# Patient Record
(demographics unavailable — no encounter records)

---

## 2024-10-09 NOTE — DATA REVIEWED
[FreeTextEntry1] : 3 x-ray views taken in the office today of her right wrist show an oblique displaced fracture of the distal ulna in addition to degenerative changes throughout the wrist and hand.

## 2024-10-09 NOTE — DISCUSSION/SUMMARY
[de-identified] : I discussed case with Dr. Morales.  Today I placed the patient in a well-fitted and molded short arm cast.  She tolerated this well.  The patient and her son understands that the fracture is displaced and we are going to keep an eye on the displacement.  She will follow-up next week for repeat x-ray and evaluation.  Cast will be gone for about a month and then we will convert her to a cock-up wrist brace.  Cast care was discussed.  All questions were answered today.  She will take Tylenol for pain.

## 2024-10-09 NOTE — PHYSICAL EXAM
[de-identified] : Physical exam of her right wrist: She has some swelling of the proximal hand secondary to the Ace wrap that was applied.  There is some ecchymosis present on the wrist.  She is  nontender over the distal radius.  She is exquisitely tender over the distal ulna.  She cannot make a full fist.  She has pain with range of motion of the wrist.  There is a mild deformity to the wrist and hand.

## 2024-10-09 NOTE — HISTORY OF PRESENT ILLNESS
[de-identified] : Patient is an 89-year-old female here for evaluation of her right wrist.  She is accompanied by her son and an aide.  She resides at [facility].  On 10/4/2024, she hit her wrist on something hard.  They took an x-ray and diagnosed her with a distal ulna fracture.

## 2024-10-16 NOTE — HISTORY OF PRESENT ILLNESS
[de-identified] : Patient is a 89-year-old female here for reevaluation of right wrist closed ulna fracture. Patient was seen in office 10/9/24, resides at [facility]. Injury 10/4/24.  Patient states that she is doing well, no complaints of pain or numbness/ tingling.  RT wrist physical exam: Patient in short arm cast. No swelling, no ecchymosis, no erythema, no abrasions. Good ROM of finger / elbow w no pain. Norvasc intact.    X-Ray right wrist today. mildly worsening but acceptable alignment of oblique fracture of distal ulna in addition to degenerative changed to the wrist and hand.  Patient will f/u in 2 weeks for repeat x-rays. Advised continuation of moving fingers and elbow. No heavy lifting, pushing, pulling, call if any questions or concerns.

## 2024-10-30 NOTE — DATA REVIEWED
[FreeTextEntry1] :  3 x-ray views repeated in the office today of her right wrist show a healing displaced distal ulna fracture.

## 2024-10-30 NOTE — DISCUSSION/SUMMARY
[de-identified] : I explained the x-rays to the patient and her son.  2 weeks ago the x-ray looks like it had displaced well, however today there is a lot of healing present.  I discussed the last x-rays with Dr. Morales who agrees to continue to treat this nonoperatively.  I removed the short arm cast and placed her in a cock up wrist brace to get acclimated to being out of the cast.  She will continue to avoid weightbearing for the next 2 weeks.  She may work on gentle range of motion of the wrist and hand.  Follow-up in 3 to 4 weeks for repeat x-rays and patient.  All questions were answered today.

## 2024-10-30 NOTE — PHYSICAL EXAM
[de-identified] :   Physical exam of her right wrist: Skin is intact after cast removal.  Resolving swelling ecchymosis.  Nontender over the radius.  She is mildly tender over the fracture site of the distal ulna.  She can make a full fist.  She has some degenerative changes to the digits.  She has pain on range of motion secondary to stiffness.  Her sensory and motor are intact.

## 2024-10-30 NOTE — HISTORY OF PRESENT ILLNESS
[de-identified] : Patient is a 9-year-old female here for repeat evaluation of her wrist.  She is about a month post a displaced distal ulna fracture.  She is accompanied by her son and an aide.  She resides at [facility].  She is feeling much better.